# Patient Record
(demographics unavailable — no encounter records)

---

## 2025-07-07 NOTE — HISTORY OF PRESENT ILLNESS
[FreeTextEntry1] : Medical Hx: HTN, Fatigue, anxiety/ depression, ADHD, asthma Family Hx: Familial heart disease    Gained 50lbs since the pandemic  Past Wt Loss Attempts: whole 30, keto, IF, Noom, tracked intact, saw RD Highest Adult Wt: 258 Lowest Adult Wt: 180  Previous medications: Vyance--fatigue  Zepbound in Jan-april, lost 15 lbs Wt 258>230   Dietary Pattern: 3 meals per day B-eggs, avocado toast  L-sandwich or salad (buys lunch by work)  D-Cooks lot of veggies at home with turkey OR a Wrap  S-ice cream  Loss of control with eating: Stress eating  Do you wake up at night to eat: in the past; not any more    Exercise Regimen: Teaches yoga; Gym-cardio mostly  Sleep: 8-9hrs; Snoring  Stress Level: Practices breathing exercises and is in therapy  Gyn: Trying to conceive  Social Hx: Dad passed (61 yr old), changed jobs since the pandemic    Labs:4/2024 TSG 1.33 Creatinine:  0.71 GFR: 114  [place]: Fat%40. %, Muscle mass 134 lbs, TBW% 40.7%, Bone Mass 7. lbs, BMR 1975  9/3/24:  This visit was provided via telehealth using real-time 2-way audio visual technology. The patient was located at home at the time of the visit. The provider, EFREN ROMAN, was located in the office ([place]) at the time of the visit. The patient and provider participated in the telehealth encounter. Patient consented to video call.  -10lbs on her home scale. Metformin XR 1000 mg with dinner; no GI upset. IR, low carb. Allows some flexibility on weekends. Adding seeds to yogurt. Lots of veggies. Weight training/cardio, yoga. Starts teaching job tomorrow. Has more energy.    10/15/24: This visit was provided via telehealth using real-time 2-way audio visual technology. The patient was located at home at the time of the visit. The provider, EFREN ROMAN, was located in the office ([place]) at the time of the visit. The patient and provider participated in the telehealth encounter. Patient consented to video call.  Metformin XR 1000 mg with dinner; no GI upset. IR/carb cycling. Working out less due to work; gym once a week; yoga 1-2 times per wk; walking. 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 Lipids improved in most recent labs. Seeing therapist.  11/27/24: This visit was provided via telehealth using real-time 2-way audio visual technology. The patient was located at home at the time of the visit. The provider, EFREN ROMAN, was located in the office ([place]) at the time of the visit. The patient and provider participated in the telehealth encounter. Patient consented to video call.  Metformin XR 1000 mg with dinner; no GI upset. Stopped doing keto. Using smart watch, 7,000 steps per day. Coaching cheerleading and teaching yoga. Mood much better, in a better place generally after job change. Will have more time for physical activity during school winter break. Trying to conceive.   5/1/125: This visit was provided via telehealth using real-time 2-way audio visual technology. The patient was located at home at the time of the visit. The provider, EFREN ROMAN, was located in the office ([place]) at the time of the visit. The patient and provider participated in the telehealth encounter. Patient consented to video call.  Metformin XR 1500 mg with dinner; tolerating well. Went back to keto.  Fractured her wrist due to a fall; was in a brace until yesterday.  Walks with neighbor. Got engaged.  Will plan to see Fertility specialist  5/1/125: This visit was provided via telehealth using real-time 2-way audio visual technology. The patient was located at home at the time of the visit. The provider, EFREN ROMAN, was located in the office ([place]) at the time of the visit. The patient and provider participated in the telehealth encounter. Patient consented to video call.  Metformin XR 1500 mg with dinner; tolerating well. Increased activity since school ended. Teaching yoga. GI upset with increase in Labetalol dose. Saw fertility specialist, doing genetic testing; all else has been normal.

## 2025-07-07 NOTE — ASSESSMENT
[FreeTextEntry1] :  Remain on Metformin  Referred to RD Send message to Cardiologist re Labetalol s/e